# Patient Record
Sex: FEMALE | Race: WHITE | NOT HISPANIC OR LATINO | Employment: FULL TIME | URBAN - METROPOLITAN AREA
[De-identification: names, ages, dates, MRNs, and addresses within clinical notes are randomized per-mention and may not be internally consistent; named-entity substitution may affect disease eponyms.]

---
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
## 2022-03-15 NOTE — LETTER
June 14, 2018         Patient: Tiffany Arce   YOB: 2000   Date of Visit: 6/14/2018           To Whom it May Concern:    Tiffany Arce was seen in my clinic on 6/14/2018. Please excuse today.     Sincerely,           Zurdo Finn M.D.  Electronically Signed     
complains of pain/discomfort

## 2022-12-11 ENCOUNTER — OFFICE VISIT (OUTPATIENT)
Dept: URGENT CARE | Facility: PHYSICIAN GROUP | Age: 22
End: 2022-12-11
Payer: COMMERCIAL

## 2022-12-11 ENCOUNTER — HOSPITAL ENCOUNTER (OUTPATIENT)
Facility: MEDICAL CENTER | Age: 22
End: 2022-12-11
Attending: FAMILY MEDICINE
Payer: COMMERCIAL

## 2022-12-11 VITALS
HEIGHT: 69 IN | TEMPERATURE: 97.9 F | RESPIRATION RATE: 16 BRPM | WEIGHT: 130 LBS | OXYGEN SATURATION: 96 % | DIASTOLIC BLOOD PRESSURE: 58 MMHG | SYSTOLIC BLOOD PRESSURE: 108 MMHG | BODY MASS INDEX: 19.26 KG/M2 | HEART RATE: 77 BPM

## 2022-12-11 DIAGNOSIS — J02.9 PHARYNGITIS, UNSPECIFIED ETIOLOGY: ICD-10-CM

## 2022-12-11 DIAGNOSIS — J03.90 ACUTE TONSILLITIS, UNSPECIFIED ETIOLOGY: ICD-10-CM

## 2022-12-11 PROCEDURE — 87070 CULTURE OTHR SPECIMN AEROBIC: CPT

## 2022-12-11 PROCEDURE — 99203 OFFICE O/P NEW LOW 30 MIN: CPT | Performed by: FAMILY MEDICINE

## 2022-12-11 RX ORDER — CEPHALEXIN 500 MG/1
500 CAPSULE ORAL 2 TIMES DAILY
Qty: 20 CAPSULE | Refills: 0 | Status: SHIPPED | OUTPATIENT
Start: 2022-12-11 | End: 2022-12-21

## 2022-12-11 ASSESSMENT — ENCOUNTER SYMPTOMS
TROUBLE SWALLOWING: 0
SORE THROAT: 1
VOMITING: 0
COUGH: 0
NAUSEA: 0
SHORTNESS OF BREATH: 0
DIZZINESS: 0
FEVER: 0
CHILLS: 0
MYALGIAS: 0

## 2022-12-12 DIAGNOSIS — J02.9 PHARYNGITIS, UNSPECIFIED ETIOLOGY: ICD-10-CM

## 2022-12-12 DIAGNOSIS — J03.90 ACUTE TONSILLITIS, UNSPECIFIED ETIOLOGY: ICD-10-CM

## 2022-12-12 NOTE — PROGRESS NOTES
Subjective:   Tiffany Arce is a 22 y.o. female who presents for Pharyngitis (White spots/Onset 1 week)        Pharyngitis   This is a new (Reports sore throat over the past week, left-sided greater than right, symptoms worse past 2 days) problem. The current episode started in the past 7 days. The problem has been gradually worsening. Pertinent negatives include no coughing, ear pain, shortness of breath, trouble swallowing or vomiting. Exposure to: Community-acquired streptococcal pharyngitis exposure. Treatments tried: Relative rest. The treatment provided mild relief.   PMH:  has a past medical history of Constipation and Headache(784.0).  MEDS:   Current Outpatient Medications:     cephALEXin (KEFLEX) 500 MG Cap, Take 1 Capsule by mouth 2 times a day for 10 days., Disp: 20 Capsule, Rfl: 0    lidocaine viscous 2% (XYLOCAINE) 2 % Solution, Take 15 mL by mouth as needed for Throat/Mouth Pain. (Patient not taking: Reported on 12/11/2022), Disp: 120 mL, Rfl: 0    Acetaminophen-Codeine 300-30 MG TABS, Take 1 Tab by mouth every 6 hours as needed (pain). (Patient not taking: Reported on 12/11/2022), Disp: 30 Each, Rfl: 0    asa/apap/caffeine (EXCEDRIN) 250-250-65 MG TABS, Take 1 Tab by mouth every 6 hours as needed. Indications: Headache (Patient not taking: Reported on 12/11/2022), Disp: , Rfl:     naproxen (NAPROSYN) 250 MG Tab, Take 250 mg by mouth every 12 hours as needed. (Patient not taking: Reported on 12/11/2022), Disp: , Rfl:   ALLERGIES: No Known Allergies  SURGHX: History reviewed. No pertinent surgical history.  SOCHX:  reports that she has never smoked. She has never used smokeless tobacco.  FH:   Family History   Problem Relation Age of Onset    Other Father         migraines with onset around age 12     Review of Systems   Constitutional:  Negative for chills and fever.   HENT:  Positive for sore throat. Negative for ear pain and trouble swallowing.    Respiratory:  Negative for cough and shortness of  "breath.    Gastrointestinal:  Negative for nausea and vomiting.   Musculoskeletal:  Negative for myalgias.   Skin:  Negative for rash.   Neurological:  Negative for dizziness.      Objective:   /58 (BP Location: Right arm, Patient Position: Sitting, BP Cuff Size: Adult)   Pulse 77   Temp 36.6 °C (97.9 °F) (Temporal)   Resp 16   Ht 1.753 m (5' 9\")   Wt 59 kg (130 lb)   SpO2 96%   BMI 19.20 kg/m²   Physical Exam  Vitals and nursing note reviewed.   Constitutional:       General: She is not in acute distress.     Appearance: She is well-developed.   HENT:      Head: Normocephalic and atraumatic.      Right Ear: External ear normal.      Left Ear: External ear normal.      Nose: Rhinorrhea present.      Mouth/Throat:      Mouth: Mucous membranes are moist.      Pharynx: Oropharyngeal exudate and posterior oropharyngeal erythema present.   Eyes:      Conjunctiva/sclera: Conjunctivae normal.   Cardiovascular:      Rate and Rhythm: Normal rate.   Pulmonary:      Effort: Pulmonary effort is normal. No respiratory distress.      Breath sounds: Normal breath sounds. No wheezing or rhonchi.   Abdominal:      General: There is no distension.   Musculoskeletal:         General: Normal range of motion.   Skin:     General: Skin is warm and dry.   Neurological:      General: No focal deficit present.      Mental Status: She is alert and oriented to person, place, and time. Mental status is at baseline.      Gait: Gait (gait at baseline) normal.   Psychiatric:         Judgment: Judgment normal.         Assessment/Plan:   1. Acute tonsillitis, unspecified etiology  - cephALEXin (KEFLEX) 500 MG Cap; Take 1 Capsule by mouth 2 times a day for 10 days.  Dispense: 20 Capsule; Refill: 0  - CULTURE THROAT; Future    2. Pharyngitis, unspecified etiology  - CULTURE THROAT; Future        Medical Decision Making/Course:  In the course of preparing for this visit with review of the pertinent past medical history, recent and past " clinic visits, current medications, and performing chart, immunization, medical history and medication reconciliation, and in the further course of obtaining the current history pertinent to the clinic visit today, performing an exam and evaluation, ordering and independently evaluating labs, tests including throat culture, and/or procedures, prescribing any recommended new medications as noted above and including in the context of shared decision making and clinical presentation consistent with acute tonsillitis, patient requested presumptive treatment with antibiotics of cephalexin pending throat culture, providing any pertinent counseling and education and recommending further coordination of care, at least  22 minutes of total time were spent during this encounter.      Discussed close monitoring, return precautions, and supportive measures of maintaining adequate fluid hydration and caloric intake, relative rest and symptom management as needed for pain and/or fever.    Differential diagnosis, natural history, supportive care, and indications for immediate follow-up discussed.     Advised the patient to follow-up with the primary care physician for recheck, reevaluation, and consideration of further management.    Please note that this dictation was created using voice recognition software. I have worked with consultants from the vendor as well as technical experts from [employer] to optimize the interface. I have made every reasonable attempt to correct obvious errors, but I expect that there are errors of grammar and possibly content that I did not discover before finalizing the note.

## 2022-12-14 LAB
BACTERIA SPEC RESP CULT: NORMAL
SIGNIFICANT IND 70042: NORMAL
SITE SITE: NORMAL
SOURCE SOURCE: NORMAL

## 2023-06-17 ENCOUNTER — HOSPITAL ENCOUNTER (EMERGENCY)
Facility: MEDICAL CENTER | Age: 23
End: 2023-06-17
Attending: EMERGENCY MEDICINE
Payer: COMMERCIAL

## 2023-06-17 VITALS
WEIGHT: 135 LBS | HEART RATE: 81 BPM | OXYGEN SATURATION: 100 % | RESPIRATION RATE: 17 BRPM | TEMPERATURE: 98.4 F | DIASTOLIC BLOOD PRESSURE: 61 MMHG | SYSTOLIC BLOOD PRESSURE: 100 MMHG | BODY MASS INDEX: 19.99 KG/M2 | HEIGHT: 69 IN

## 2023-06-17 DIAGNOSIS — Z77.21 EXPOSURE TO BODY FLUID: ICD-10-CM

## 2023-06-17 LAB
ALBUMIN SERPL BCP-MCNC: 4.7 G/DL (ref 3.2–4.9)
ALBUMIN/GLOB SERPL: 1.9 G/DL
ALP SERPL-CCNC: 73 U/L (ref 30–99)
ALT SERPL-CCNC: 11 U/L (ref 2–50)
ANION GAP SERPL CALC-SCNC: 12 MMOL/L (ref 7–16)
AST SERPL-CCNC: 18 U/L (ref 12–45)
BILIRUB SERPL-MCNC: 0.5 MG/DL (ref 0.1–1.5)
BUN SERPL-MCNC: 13 MG/DL (ref 8–22)
CALCIUM SERPL-MCNC: 9.5 MG/DL (ref 8.5–10.5)
CHLORIDE SERPL-SCNC: 101 MMOL/L (ref 96–112)
CO2 SERPL-SCNC: 25 MMOL/L (ref 20–33)
CREAT SERPL-MCNC: 0.79 MG/DL (ref 0.5–1.4)
ERYTHROCYTE [DISTWIDTH] IN BLOOD BY AUTOMATED COUNT: 44.9 FL (ref 35.9–50)
GFR SERPLBLD CREATININE-BSD FMLA CKD-EPI: 108 ML/MIN/1.73 M 2
GLOBULIN SER CALC-MCNC: 2.5 G/DL (ref 1.9–3.5)
GLUCOSE SERPL-MCNC: 92 MG/DL (ref 65–99)
HBV CORE AB SERPL QL IA: NONREACTIVE
HBV SURFACE AB SERPL IA-ACNC: 12 MIU/ML (ref 0–10)
HBV SURFACE AG SER QL: ABNORMAL
HCT VFR BLD AUTO: 39.8 % (ref 37–47)
HCV AB SER QL: ABNORMAL
HGB BLD-MCNC: 13.6 G/DL (ref 12–16)
HIV 1+2 AB+HIV1 P24 AG SERPL QL IA: ABNORMAL
MCH RBC QN AUTO: 30.8 PG (ref 27–33)
MCHC RBC AUTO-ENTMCNC: 34.2 G/DL (ref 32.2–35.5)
MCV RBC AUTO: 90 FL (ref 81.4–97.8)
PLATELET # BLD AUTO: 266 K/UL (ref 164–446)
PMV BLD AUTO: 9.3 FL (ref 9–12.9)
POTASSIUM SERPL-SCNC: 3.7 MMOL/L (ref 3.6–5.5)
PROT SERPL-MCNC: 7.2 G/DL (ref 6–8.2)
RBC # BLD AUTO: 4.42 M/UL (ref 4.2–5.4)
SODIUM SERPL-SCNC: 138 MMOL/L (ref 135–145)
WBC # BLD AUTO: 5.6 K/UL (ref 4.8–10.8)

## 2023-06-17 PROCEDURE — 86706 HEP B SURFACE ANTIBODY: CPT

## 2023-06-17 PROCEDURE — 80053 COMPREHEN METABOLIC PANEL: CPT

## 2023-06-17 PROCEDURE — 700111 HCHG RX REV CODE 636 W/ 250 OVERRIDE (IP): Performed by: EMERGENCY MEDICINE

## 2023-06-17 PROCEDURE — A9270 NON-COVERED ITEM OR SERVICE: HCPCS | Performed by: EMERGENCY MEDICINE

## 2023-06-17 PROCEDURE — 700102 HCHG RX REV CODE 250 W/ 637 OVERRIDE(OP): Performed by: EMERGENCY MEDICINE

## 2023-06-17 PROCEDURE — 86704 HEP B CORE ANTIBODY TOTAL: CPT

## 2023-06-17 PROCEDURE — 90471 IMMUNIZATION ADMIN: CPT

## 2023-06-17 PROCEDURE — 36415 COLL VENOUS BLD VENIPUNCTURE: CPT

## 2023-06-17 PROCEDURE — 86803 HEPATITIS C AB TEST: CPT

## 2023-06-17 PROCEDURE — 99284 EMERGENCY DEPT VISIT MOD MDM: CPT

## 2023-06-17 PROCEDURE — 87389 HIV-1 AG W/HIV-1&-2 AB AG IA: CPT

## 2023-06-17 PROCEDURE — 87340 HEPATITIS B SURFACE AG IA: CPT

## 2023-06-17 PROCEDURE — 90715 TDAP VACCINE 7 YRS/> IM: CPT | Performed by: EMERGENCY MEDICINE

## 2023-06-17 PROCEDURE — 85027 COMPLETE CBC AUTOMATED: CPT

## 2023-06-17 RX ORDER — RALTEGRAVIR 400 MG/1
400 TABLET, FILM COATED ORAL 2 TIMES DAILY
Qty: 56 TABLET | Refills: 0 | Status: ACTIVE | OUTPATIENT
Start: 2023-06-17 | End: 2023-07-15

## 2023-06-17 RX ORDER — EMTRICITABINE AND TENOFOVIR DISOPROXIL FUMARATE 200; 300 MG/1; MG/1
1 TABLET, FILM COATED ORAL DAILY
Qty: 28 TABLET | Refills: 0 | Status: ACTIVE | OUTPATIENT
Start: 2023-06-17 | End: 2023-07-15

## 2023-06-17 RX ORDER — EMTRICITABINE AND TENOFOVIR DISOPROXIL FUMARATE 200; 300 MG/1; MG/1
1 TABLET, FILM COATED ORAL ONCE
Status: COMPLETED | OUTPATIENT
Start: 2023-06-17 | End: 2023-06-17

## 2023-06-17 RX ADMIN — CLOSTRIDIUM TETANI TOXOID ANTIGEN (FORMALDEHYDE INACTIVATED), CORYNEBACTERIUM DIPHTHERIAE TOXOID ANTIGEN (FORMALDEHYDE INACTIVATED), BORDETELLA PERTUSSIS TOXOID ANTIGEN (GLUTARALDEHYDE INACTIVATED), BORDETELLA PERTUSSIS FILAMENTOUS HEMAGGLUTININ ANTIGEN (FORMALDEHYDE INACTIVATED), BORDETELLA PERTUSSIS PERTACTIN ANTIGEN, AND BORDETELLA PERTUSSIS FIMBRIAE 2/3 ANTIGEN 0.5 ML: 5; 2; 2.5; 5; 3; 5 INJECTION, SUSPENSION INTRAMUSCULAR at 20:53

## 2023-06-17 RX ADMIN — RALTEGRAVIR 400 MG: 400 TABLET, FILM COATED ORAL at 20:35

## 2023-06-17 RX ADMIN — EMTRICITABINE AND TENOFOVIR DISOPROXIL FUMARATE 1 TABLET: 200; 300 TABLET, FILM COATED ORAL at 20:35

## 2023-06-17 NOTE — LETTER
"  FORM C-4:  EMPLOYEE’S CLAIM FOR COMPENSATION/ REPORT OF INITIAL TREATMENT  EMPLOYEE’S CLAIM - PROVIDE ALL INFORMATION REQUESTED   First Name Tiffany Last Name Claude Birthdate 2000  Sex female Claim Number   Home Address [address]   [facility]             [place] [address]                                   Age  23 y.o. 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 (if applicable) cleaning the ambulance    How did this injury or occupational disease occur? Be specific and answer in detail. Use additional sheet if necessary)  was cleaning the ambulance after a call, picked up a piece of trach and was poked by what felt like a needle   If you believe that you have an occupational disease, when did you first have knowledge of the disability and it relationship to your employment? NA Witnesses to the Accident  NA   Nature of Injury or Occupational Disease  Workers' Compensation Part(s) of Body Injured or Affected  Finger (R)   I CERTIFY THAT THE ABOVE IS TRUE AND CORRECT TO THE BEST OF MY KNOWLEDGE AND THAT I HAVE PROVIDED THIS INFORMATION IN ORDER TO OBTAIN THE BENEFITS OF [place] IN[place] OR CHAPTER " 617 [place]).  I HEREBY AUTHORIZE ANY PHYSICIAN, CHIROPRACTOR, SURGEON, PRACTITIONER, OR OTHER PERSON, ANY HOSPITAL, INCLUDING [facility] OR [facility], ANY MEDICAL SERVICE ORGANIZATION, ANY INSURANCE COMPANY, OR OTHER INSTITUTION OR ORGANIZATION TO RELEASE TO EACH OTHER, ANY MEDICAL OR OTHER INFORMATION, INCLUDING BENEFITS PAID OR PAYABLE, PERTINENT TO THIS INJURY OR DISEASE, EXCEPT INFORMATION RELATIVE TO DIAGNOSIS, TREATMENT AND/OR COUNSELING FOR AIDS, PSYCHOLOGICAL CONDITIONS, ALCOHOL OR CONTROLLED SUBSTANCES, FOR WHICH I MUST GIVE SPECIFIC AUTHORIZATION.  A PHOTOSTAT OF THIS AUTHORIZATION SHALL BE AS VALID AS THE ORIGINAL.  Date 06/17/2023               [facility]                        Employee’s Signature   THIS REPORT MUST BE COMPLETED AND MAILED WITHIN 3 WORKING DAYS OF TREATMENT   Place [facility], EMERGENCY DEPT                       Name of Facility [facility]   Date  6/17/2023 Diagnosis  (Z77.21) Exposure to body fluid Is there evidence the injured employee was under the influence of alcohol and/or another controlled substance at the time of accident?   Hour  8:54 PM Description of Injury or Disease  Exposure to body fluid No   Treatment  HIV prophylaxis for needle stick  Have you advised the patient to remain off work five days or more?         No   X-Ray Findings    If Yes   From Date    To Date      From information given by the employee, together with medical evidence, can you directly connect this injury or occupational disease as job incurred? Yes If No, is employee capable of: Full Duty  Yes Modified Duty      Is additional medical care by a physician indicated? Yes If Modified Duty, Specify any Limitations / Restrictions       Do you know of any previous injury or disease contributing to this condition or occupational disease? No    Date 6/17/2023 Print Doctor’s Name Yazmin Xie I I certify the employer’s  "copy of this form was mailed on:   Address [address] [place] [address]  [place] [id number] [phone] INSURER’S USE ONLY   Provider’s Tax ID Number [id number] Telephone Dept: [phone]    Doctor’s Signature AYANNA Harrison M.D. [facility].      Form C-4 (rev.10/07)                                                                         BRIEF DESCRIPTION OF RIGHTS AND BENEFITS  (Pursuant to [place] 616C.050)    Notice of Injury or Occupational Disease (Incident Report Form C-1): If an injury or occupational disease (OD) arises out of and in the course of employment, you must provide written notice to your employer as soon as practicable, but no later than 7 days after the accident or OD. Your employer shall maintain a sufficient supply of the required forms.    Claim for Compensation (Form C-4): If medical treatment is sought, the form C-4 is available at the place of initial treatment. A completed \"Claim for Compensation\" (Form C-4) must be filed within 90 days after an accident or OD. The treating physician or chiropractor must, within 3 working days after treatment, complete and mail to the employer, the employer's insurer and third-party , the Claim for Compensation.    Medical Treatment: If you require medical treatment for your on-the-job injury or OD, you may be required to select a physician or chiropractor from a list provided by your workers’ compensation insurer, if it has contracted with an Organization for Managed Care (MCO) or Preferred Provider Organization (PPO) or providers of health care. If your employer has not entered into a contract with an MCO or PPO, you may select a physician or chiropractor from the Panel of Physicians and Chiropractors. Any medical costs related to your industrial injury or OD will be paid by your insurer.    Temporary Total Disability (TTD): If your doctor has certified that you are unable to work for a period of at least 5 consecutive days, or 5 cumulative days " in a 20-day period, or places restrictions on you that your employer does not accommodate, you may be entitled to TTD compensation.    Temporary Partial Disability (TPD): If the wage you receive upon reemployment is less than the compensation for TTD to which you are entitled, the insurer may be required to pay you TPD compensation to make up the difference. TPD can only be paid for a maximum of 24 months.    Permanent Partial Disability (PPD): When your medical condition is stable and there is an indication of a PPD as a result of your injury or OD, within 30 days, your insurer must arrange for an evaluation by a rating physician or chiropractor to determine the degree of your PPD. The amount of your PPD award depends on the date of injury, the results of the PPD evaluation, your age and wage.    Permanent Total Disability (PTD): If you are medically certified by a treating physician or chiropractor as permanently and totally disabled and have been granted a PTD status by your insurer, you are entitled to receive monthly benefits not to exceed 66 2/3% of your average monthly wage. The amount of your PTD payments is subject to reduction if you previously received a lump-sum PPD award.    Vocational Rehabilitation Services: You may be eligible for vocational rehabilitation services if you are unable to return to the job due to a permanent physical impairment or permanent restrictions as a result of your injury or occupational disease.    Transportation and Per Usha Reimbursement: You may be eligible for travel expenses and per usha associated with medical treatment.    Reopening: You may be able to reopen your claim if your condition worsens after claim closure.     Appeal Process: If you disagree with a written determination issued by the insurer or the insurer does not respond to your request, you may appeal to the Department of Administration, , by following the instructions contained in your  determination letter. You must appeal the determination within 70 days from the date of the determination letter at [address], [address], [place], [place] [address], or [address], [address], [place], [place] [address]. If you disagree with the  decision, you may appeal to the Department of Administration, . You must file your appeal within 30 days from the date of the  decision letter at [address], [address], [place], [place] [address], or [address], [address], [place], [place] [address]. If you disagree with a decision of an , you may file a petition for judicial review with the District Court. You must do so within 30 days of the Appeal Officer’s decision. You may be represented by an  at your own expense or you may contact the [facility] for possible representation.    [place]  for Injured Workers (NAIW): If you disagree with a  decision, you may request that NAIW represent you without charge at an  Hearing. For information regarding denial of benefits, you may contact the [facility] at: [address], [address], [place] [address], [phone], or [address], [place] [address], [phone]    To File a Complaint with the Division: If you wish to file a complaint with the  of the Division of Industrial Relations (DIR),  please contact the Workers’ Compensation Section, [address], [address], [place], [place] [address], telephone [phone]) [phone], or [address], [address], [place], [place] [address], telephone [phone].    For assistance with Workers’ Compensation Issues: You may contact the [facility] Office for Consumer Health Assistance, [address], [address], [place], [place] [address], Toll Free 1-461.590.7297, Web site: http://[facility].[place].gov/Programs/BRO E-mail: bro@[facility].[place].gov  D-2 (rev.  10/20)              __________________________________________________________________                                    06/17/2023            Employee Name / Signature                                                                                                                            Date

## 2023-06-18 NOTE — DISCHARGE INSTRUCTIONS
You were seen in the Emergency Department for possible body fluid exposure.    Labs returned so far normal.    Please use 1,000mg of tylenol or 600mg of ibuprofen every 6 hours as needed for pain.  Take HIV prophylaxis medication as directed until follow-up with occupational health.    Please follow up with your primary care physician.    Return to the Emergency Department with other concerns.

## 2023-06-18 NOTE — ED PROVIDER NOTES
"ED Provider Note    CHIEF COMPLAINT  Chief Complaint   Patient presents with    Other     Needlestick exposure from unknown source.     EXTERNAL RECORDS REVIEWED  None    HPI/ROS  LIMITATION TO HISTORY   Select: : None  OUTSIDE HISTORIAN(S):  None    Tiffany Arce is a 23 y.o. female who presents to the Emergency Department for evaluation after a possible needlestick exposure. 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 She found a wad of cloth and she went to crumple it up to throw it away and felt a stab in her right second finger. She was not able to find a needle in the wad of cloth, unsure if it was contaminated however the ambulance was a mess. No alleviating or exacerbating factors noted.     PAST MEDICAL HISTORY  Past Medical History:   Diagnosis Date    Constipation     Headache(784.0)         SURGICAL HISTORY  History reviewed. No pertinent surgical history.     FAMILY HISTORY  Family History   Problem Relation Age of Onset    Other Father         migraines with onset around age 11       SOCIAL HISTORY   reports that she has never smoked. She has never used smokeless tobacco. She reports current alcohol use. She reports that she does not use drugs.    CURRENT MEDICATIONS  Previous Medications    ACETAMINOPHEN-CODEINE 300-30 MG TABS    Take 1 Tab by mouth every 6 hours as needed (pain).    ASA/APAP/CAFFEINE (EXCEDRIN) 250-250-65 MG TABS    Take 1 Tab by mouth every 6 hours as needed. Indications: Headache    LIDOCAINE VISCOUS 2% (XYLOCAINE) 2 % SOLUTION    Take 15 mL by mouth as needed for Throat/Mouth Pain.    NAPROXEN (NAPROSYN) 250 MG TAB    Take 250 mg by mouth every 12 hours as needed.       ALLERGIES  Patient has no known allergies.    PHYSICAL EXAM  /75   Pulse 91   Temp 36.2 °C (97.2 °F) (Temporal)   Resp 15   Ht 1.753 m (5' 9\")   Wt 61.2 kg (135 lb)   SpO2 97%    Constitutional: Nontoxic appearing. Alert in no apparent distress.  HENT: Normocephalic, " Atraumatic.  Moist mucous membranes.    Neck: Supple, full range of motion  Musculoskeletal: Atraumatic. No obvious deformities noted.   Skin: Warm, Dry.  No erythema, No rash. Small prick to the right second finger.   Neurologic: Alert and oriented x3. Moving all extremities spontaneously without focal deficits.  Psychiatric: Affect normal, Mood normal, Appears appropriate and not intoxicated.    DIAGNOSTIC STUDIES / PROCEDURES    LABS  Labs Reviewed   EXPOSED PERSON-SOURCE PT POSITIVE OR UNK (BLOOD & BODY FLUID EXPOSURE)   ESTIMATED GFR        COURSE & MEDICAL DECISION MAKING    7:40 PM - Patient seen and examined at bedside. I discussed my plan of care with the patient, which includes lab testing for blood borne pathogens. Patient verbalizes understanding and agreement to this plan of care. I gave her the option for viral exposure treatment. I also instructed her to follow up with occupational health. She is understandable and agreeable with the plan of care. She will be treated with Adacel 0.5 mL.    ED Observation Status? No; Patient does not meet criteria for ED Observation.     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  Patient's status is unknown and it is actually unknown what cut her finger however did break the skin.  Tetanus vaccination is up-to-date.  Labs were drawn.  Patient was offered HIV postexposure prophylaxis which she will take until she is able to follow-up with occupational health.    ADDITIONAL PROBLEM LIST  Problem #1: Body fluid exposure - discharge with HIV Pap and follow-up as an outpatient with occupational health      DISPOSITION AND DISCUSSIONS  Decision tools and prescription drugs considered including, but not limited to: Pain Medications over-the-counter medication should be sufficient .    The patient will return for new or worsening symptoms and is stable at the time of discharge.    The patient is  referred to a primary physician for blood pressure management, diabetic screening, and for all other preventative health concerns.    DISPOSITION:  Patient will be discharged home in stable condition.    FOLLOW UP:  [facility]  [address]  [address]  [place] [id number] [phone]  Schedule an appointment as soon as possible for a visit       [facility], Emergency Dept  [address]  [place] [address] [phone]    If symptoms worsen      OUTPATIENT MEDICATIONS:  New Prescriptions    EMTRICITABINE-TENOFOVIR (TRUVADA) 200-300 MG PER TABLET    Take 1 Tablet by mouth every day for 28 days.    RALTEGRAVIR (ISENTRESS) 400 MG TAB    Take 1 Tablet by mouth 2 times a day for 28 days.       FINAL DIAGNOSIS  1. Exposure to body fluid        The note accurately reflects work and decisions made by me.  Yazmin Xie M.D.  6/18/2023  2:08 PM     Hernán CHAU (Nikky), am scribing for, and in the presence of, Yazmin Xie M.D..    Electronically signed by: Hernán Dietrich (Nikky), 6/17/2023    Yazmin CHAU M.D. personally performed the services described in this documentation, as scribed by Hernán Dietrich in my presence, and it is both accurate and complete.

## 2023-06-18 NOTE — ED NOTES
Patient discharged home per ERP.  Discharge teaching and education discussed with patient. POC discussed.   Patient verbalized understanding of discharge teaching and education. No other questions at this time.     RX x 2 given to patient.       VSS. Patient alert and oriented. Patient arranged ride for self. Able to ambulate off unit safely with steady gait.

## 2023-06-18 NOTE — ED NOTES
Pt medicated per MAR  Per pt, she believes she is up to date on tetanus   MD contacted about completion of Workers Comp

## 2023-07-21 ENCOUNTER — OFFICE VISIT (OUTPATIENT)
Dept: URGENT CARE | Facility: CLINIC | Age: 23
End: 2023-07-21

## 2023-07-21 DIAGNOSIS — Z11.1 PPD SCREENING TEST: Primary | ICD-10-CM

## 2023-07-22 NOTE — PROGRESS NOTES
Tiffany Arce is a 23 y.o. female here for a non-provider visit for PPD placement -- Step 1 of 1    Reason for PPD:  work requirement    1. TB evaluation questionnaire completed by patient? Yes      -  If any answers marked yes did you contact a provider prior to placing? Not Indicated  2.  Patient notified to return to clinic for reading on: 7/23-7/24  3.  PPD Placement documentation completed on TB evaluation questionnaire? YES  4.  Location of TB evaluation questionnaire filed: [place]

## 2023-07-24 ENCOUNTER — NON-PROVIDER VISIT (OUTPATIENT)
Dept: URGENT CARE | Facility: CLINIC | Age: 23
End: 2023-07-24

## 2023-07-24 LAB — TB WHEAL 3D P 5 TU DIAM: NORMAL MM

## 2024-01-30 ENCOUNTER — APPOINTMENT (OUTPATIENT)
Dept: URGENT CARE | Facility: PHYSICIAN GROUP | Age: 24
End: 2024-01-30
Payer: COMMERCIAL

## 2025-01-25 ENCOUNTER — OFFICE VISIT (OUTPATIENT)
Dept: URGENT CARE | Facility: PHYSICIAN GROUP | Age: 25
End: 2025-01-25
Payer: COMMERCIAL

## 2025-01-25 VITALS
HEIGHT: 69 IN | SYSTOLIC BLOOD PRESSURE: 108 MMHG | OXYGEN SATURATION: 98 % | HEART RATE: 95 BPM | BODY MASS INDEX: 22.33 KG/M2 | WEIGHT: 150.79 LBS | RESPIRATION RATE: 18 BRPM | DIASTOLIC BLOOD PRESSURE: 68 MMHG | TEMPERATURE: 97.9 F

## 2025-01-25 DIAGNOSIS — J34.89 SINUS PRESSURE: ICD-10-CM

## 2025-01-25 DIAGNOSIS — B97.89 ACUTE VIRAL SINUSITIS: ICD-10-CM

## 2025-01-25 DIAGNOSIS — J01.90 ACUTE VIRAL SINUSITIS: ICD-10-CM

## 2025-01-25 PROCEDURE — 3074F SYST BP LT 130 MM HG: CPT

## 2025-01-25 PROCEDURE — 3078F DIAST BP <80 MM HG: CPT

## 2025-01-25 PROCEDURE — 99213 OFFICE O/P EST LOW 20 MIN: CPT

## 2025-01-25 RX ORDER — ACETAMINOPHEN 120 MG/1
120 SUPPOSITORY RECTAL EVERY 4 HOURS PRN
COMMUNITY

## 2025-01-25 RX ORDER — IBUPROFEN 600 MG/1
600 TABLET, FILM COATED ORAL EVERY 6 HOURS PRN
COMMUNITY

## 2025-01-25 RX ORDER — PREDNISONE 20 MG/1
40 TABLET ORAL DAILY
Qty: 10 TABLET | Refills: 0 | Status: SHIPPED | OUTPATIENT
Start: 2025-01-25 | End: 2025-01-30

## 2025-01-25 ASSESSMENT — ENCOUNTER SYMPTOMS
MYALGIAS: 0
WHEEZING: 0
DIARRHEA: 0
COUGH: 0
DIAPHORESIS: 0
WEAKNESS: 0
NAUSEA: 0
BLOOD IN STOOL: 0
SORE THROAT: 0
EYE DISCHARGE: 0
HEADACHES: 0
PSYCHIATRIC NEGATIVE: 1
BLURRED VISION: 0
CHILLS: 0
SPUTUM PRODUCTION: 0
SINUS PAIN: 1
ABDOMINAL PAIN: 0
SHORTNESS OF BREATH: 0
DIZZINESS: 0
VOMITING: 0
FEVER: 0

## 2025-01-25 ASSESSMENT — FIBROSIS 4 INDEX: FIB4 SCORE: 0.47

## 2025-01-25 NOTE — PROGRESS NOTES
"Subjective:   Tiffany Arce is a 24 y.o. female who presents for Sinus Problem (Nasal congestion, cough, sinus pressure, X 2 weeks. Now has a headache. )      HPI  Patient complains of sinus pressure and headache for three days. She has been taking mucinex, motrin, and tylenol.       Review of Systems   Constitutional:  Negative for chills, diaphoresis, fever and malaise/fatigue.   HENT:  Positive for congestion and sinus pain. Negative for ear pain and sore throat.    Eyes:  Negative for blurred vision and discharge.   Respiratory:  Negative for cough, sputum production, shortness of breath and wheezing.    Cardiovascular:  Negative for chest pain.   Gastrointestinal:  Negative for abdominal pain, blood in stool, diarrhea, nausea and vomiting.   Genitourinary: Negative.    Musculoskeletal:  Negative for myalgias.   Skin:  Negative for rash.   Neurological:  Negative for dizziness, weakness and headaches.   Endo/Heme/Allergies: Negative.    Psychiatric/Behavioral: Negative.     All other systems reviewed and are negative.      Medical History:  Past Medical History:   Diagnosis Date    Constipation     Headache(784.0)        Allergies:  No Known Allergies    Social history, surgical history, medications, and current problem list reviewed today in Epic.       Objective:     /68 (BP Location: Left arm, Patient Position: Sitting, BP Cuff Size: Adult long)   Pulse 95   Temp 36.6 °C (97.9 °F) (Temporal)   Resp 18   Ht 1.753 m (5' 9\")   Wt 68.4 kg (150 lb 12.7 oz)   SpO2 98%     Physical Exam  Vitals reviewed.   Constitutional:       General: She is not in acute distress.     Appearance: Normal appearance. She is not ill-appearing, toxic-appearing or diaphoretic.   HENT:      Head: Normocephalic.      Jaw: There is normal jaw occlusion.      Right Ear: Tympanic membrane, ear canal and external ear normal.      Left Ear: Tympanic membrane, ear canal and external ear normal.      Nose: Congestion and " rhinorrhea present.      Right Sinus: No maxillary sinus tenderness or frontal sinus tenderness.      Left Sinus: No maxillary sinus tenderness or frontal sinus tenderness.      Mouth/Throat:      Lips: Pink.      Mouth: Mucous membranes are moist.      Tongue: Tongue does not deviate from midline.      Pharynx: Oropharynx is clear. Uvula midline. No oropharyngeal exudate, posterior oropharyngeal erythema or uvula swelling.   Eyes:      Extraocular Movements: Extraocular movements intact.      Conjunctiva/sclera: Conjunctivae normal.      Pupils: Pupils are equal, round, and reactive to light.   Cardiovascular:      Rate and Rhythm: Normal rate and regular rhythm.      Pulses: Normal pulses.      Heart sounds: Normal heart sounds.   Pulmonary:      Effort: Pulmonary effort is normal.      Breath sounds: Normal breath sounds.   Abdominal:      General: Abdomen is flat.      Palpations: Abdomen is soft.      Tenderness: There is no abdominal tenderness.   Musculoskeletal:         General: Normal range of motion.      Cervical back: Normal range of motion and neck supple.   Lymphadenopathy:      Cervical: No cervical adenopathy.   Skin:     General: Skin is warm.      Capillary Refill: Capillary refill takes less than 2 seconds.   Neurological:      General: No focal deficit present.      Mental Status: She is alert and oriented to person, place, and time.   Psychiatric:         Mood and Affect: Mood normal.         Behavior: Behavior normal.         Assessment/Plan:       Diagnosis and associated orders:     1. Sinus pressure  - predniSONE (DELTASONE) 20 MG Tab; Take 2 Tablets by mouth every day for 5 days.  Dispense: 10 Tablet; Refill: 0    2. Acute viral sinusitis  - predniSONE (DELTASONE) 20 MG Tab; Take 2 Tablets by mouth every day for 5 days.  Dispense: 10 Tablet; Refill: 0    Other orders  - acetaminophen (TYLENOL) 120 MG Suppos; Insert 120 mg into the rectum every four hours as needed.  - ibuprofen (MOTRIN) 600  MG Tab; Take 600 mg by mouth every 6 hours as needed.     Comments/MDM:   I personally reviewed prior external notes and test results pertinent to today's visit as well as additional imaging and testing completed in clinic today.     Very pleasant 24-year-old afebrile, hemodynamically stable, generally well-appearing female presenting with complaints of sinus pressure and headache for 3 days.  She has tried over-the-counter saline nasal rinse with no improvement.  No fever.  At this time I do believe patient is suffering from acute viral sinus infection.  Low clinical likelihood of bacterial sinus infection as she is afebrile.  I do not believe antibiotics are warranted at this time.  We did discuss viral versus bacterial etiology with strict precautions to return to clinic if symptoms worsen.  Patient agreeable to 5-day course of prednisone and to continue using nasal saline rinse and over-the-counter NSAIDs.  Patient verbalized understanding.      Patient is clinically stable at today's acute urgent care visit. Vital signs are normal and reassuring.  No acute distress noted. Appropriate for outpatient management at this time. No red flag warnings noted.  Patient given strict instructions to follow up with emergency room if they develop any red flag warnings which were discussed in depth.  They verbalized understanding.      Differential diagnosis, natural history, supportive care, and indications for immediate follow-up discussed. All questions answered. Patient agrees with the plan of care. Advised the patient to follow-up with the primary care physician for recheck, reevaluation, and consideration of further management or the emergency room for worsening symptoms.      Please note that this dictation was created using voice recognition software. I have made every reasonable attempt to correct obvious errors, but I expect that there are errors of grammar and possibly content that I did not discover before  finalizing the note.

## 2025-03-07 ENCOUNTER — OFFICE VISIT (OUTPATIENT)
Dept: URGENT CARE | Facility: PHYSICIAN GROUP | Age: 25
End: 2025-03-07
Payer: COMMERCIAL

## 2025-03-07 VITALS
SYSTOLIC BLOOD PRESSURE: 108 MMHG | HEART RATE: 67 BPM | BODY MASS INDEX: 22.66 KG/M2 | TEMPERATURE: 97.9 F | RESPIRATION RATE: 14 BRPM | OXYGEN SATURATION: 96 % | HEIGHT: 69 IN | WEIGHT: 153 LBS | DIASTOLIC BLOOD PRESSURE: 60 MMHG

## 2025-03-07 DIAGNOSIS — J02.9 PHARYNGITIS, UNSPECIFIED ETIOLOGY: ICD-10-CM

## 2025-03-07 LAB — S PYO DNA SPEC NAA+PROBE: NOT DETECTED

## 2025-03-07 PROCEDURE — 87651 STREP A DNA AMP PROBE: CPT | Performed by: PHYSICIAN ASSISTANT

## 2025-03-07 PROCEDURE — 99213 OFFICE O/P EST LOW 20 MIN: CPT | Performed by: PHYSICIAN ASSISTANT

## 2025-03-07 PROCEDURE — 3078F DIAST BP <80 MM HG: CPT | Performed by: PHYSICIAN ASSISTANT

## 2025-03-07 PROCEDURE — 3074F SYST BP LT 130 MM HG: CPT | Performed by: PHYSICIAN ASSISTANT

## 2025-03-07 ASSESSMENT — ENCOUNTER SYMPTOMS
MYALGIAS: 0
ABDOMINAL PAIN: 0
SORE THROAT: 1
FEVER: 0
VOMITING: 0
CHILLS: 0
SHORTNESS OF BREATH: 0
CONSTIPATION: 0
DIARRHEA: 0
NAUSEA: 0
EYE PAIN: 0
HEADACHES: 0
COUGH: 0

## 2025-03-07 ASSESSMENT — FIBROSIS 4 INDEX: FIB4 SCORE: 0.49

## 2025-03-08 NOTE — PROGRESS NOTES
"Subjective:   Tiffany Arce is a 25 y.o. female who presents for Pharyngitis (Sore throat,congestion x 6 days )      6-7 days of sore throat with generalized malaise, congestion only started yesterday.  No fevers or significant coughing. Concerned about strep.     Review of Systems   Constitutional:  Positive for malaise/fatigue. Negative for chills and fever.   HENT:  Positive for congestion and sore throat. Negative for ear pain.    Eyes:  Negative for pain.   Respiratory:  Negative for cough and shortness of breath.    Cardiovascular:  Negative for chest pain.   Gastrointestinal:  Negative for abdominal pain, constipation, diarrhea, nausea and vomiting.   Genitourinary:  Negative for dysuria.   Musculoskeletal:  Negative for myalgias.   Skin:  Negative for rash.   Neurological:  Negative for headaches.       Medications, Allergies, and current problem list reviewed today in Epic.     Objective:     /60   Pulse 67   Temp 36.6 °C (97.9 °F) (Temporal)   Resp 14   Ht 1.753 m (5' 9\")   Wt 69.4 kg (153 lb)   SpO2 96%     Physical Exam  Vitals reviewed.   Constitutional:       Appearance: Normal appearance.   HENT:      Head: Normocephalic and atraumatic.      Right Ear: Tympanic membrane, ear canal and external ear normal.      Left Ear: Tympanic membrane, ear canal and external ear normal.      Nose: Rhinorrhea present.      Mouth/Throat:      Mouth: Mucous membranes are moist.      Pharynx: Oropharyngeal exudate and posterior oropharyngeal erythema present.   Eyes:      Conjunctiva/sclera: Conjunctivae normal.      Pupils: Pupils are equal, round, and reactive to light.   Cardiovascular:      Rate and Rhythm: Normal rate and regular rhythm.   Pulmonary:      Effort: Pulmonary effort is normal.      Breath sounds: Normal breath sounds.   Lymphadenopathy:      Cervical: No cervical adenopathy.   Skin:     General: Skin is warm and dry.      Capillary Refill: Capillary refill takes less than 2 " seconds.   Neurological:      Mental Status: She is alert and oriented to person, place, and time.       Assessment/Plan:     Diagnosis and associated orders:     1. Pharyngitis, unspecified etiology  POCT CEPHEID [employer] A STREP - PCR         Comments/MDM:     Strep negative  Suspect viral process  No evidence of deep space infection  Offered decadron but patient declines  Follow up as needed         Differential diagnosis, natural history, supportive care, and indications for immediate follow-up discussed.    Advised the patient to follow-up with the primary care physician for recheck, reevaluation, and consideration of further management.    Please note that this dictation was created using voice recognition software. I have made a reasonable attempt to correct obvious errors, but I expect that there are errors of grammar and possibly content that I did not discover before finalizing the note.    This note was electronically signed by Gerhard Howell PA-C